# Patient Record
(demographics unavailable — no encounter records)

---

## 2025-05-06 NOTE — PLAN
[FreeTextEntry1] : annual: pap and hpv neg last yr not needed  will try ot find new PMD breast imaging ordered had br bx last yr: benign did colonosocpy reminded to do skin check periods mostly reg but thrown off by covid in [place] perimenopause discussed exercises 6 days week

## 2025-05-06 NOTE — HISTORY OF PRESENT ILLNESS
[Patient reported mammogram was normal] : Patient reported mammogram was normal [Patient reported PAP Smear was normal] : Patient reported PAP Smear was normal [Patient reported colonoscopy was normal] : Patient reported colonoscopy was normal [Y] : Patient is sexually active [N] : Patient denies prior pregnancies [Currently Active] : currently active [Men] : men [No] : No [Patient refuses STI testing] : Patient refuses STI testing [Mammogramdate] : 2024 [TextBox_19] : Dense breast [BreastSonogramDate] : 2024 [PapSmeardate] : 2024 [ColonoscopyDate] : 2024 [TextBox_43] : polyps  [LMPDate] : 04/28/25 [MensesFreq] : 29 [MensesLength] : 3 [TextBox_6] : 04/28/25 [Normal Amount/Duration] :  normal amount and duration [Regular Cycle Intervals] : periods have been regular [FreeTextEntry1] : 4/28/25